# Patient Record
Sex: MALE | Race: WHITE | NOT HISPANIC OR LATINO | Employment: FULL TIME | URBAN - METROPOLITAN AREA
[De-identification: names, ages, dates, MRNs, and addresses within clinical notes are randomized per-mention and may not be internally consistent; named-entity substitution may affect disease eponyms.]

---

## 2021-12-30 ENCOUNTER — E-VISIT (OUTPATIENT)
Dept: FAMILY MEDICINE | Facility: CLINIC | Age: 31
End: 2021-12-30
Payer: COMMERCIAL

## 2021-12-30 DIAGNOSIS — Z20.822 SUSPECTED COVID-19 VIRUS INFECTION: Primary | ICD-10-CM

## 2021-12-30 PROCEDURE — 99421 OL DIG E/M SVC 5-10 MIN: CPT | Performed by: NURSE PRACTITIONER

## 2021-12-31 NOTE — PATIENT INSTRUCTIONS
Mario,      Based on your responses, you may have coronavirus (COVID-19). This illness can cause fever, cough and trouble breathing. Many people get a mild case and get better on their own. Some people can get very sick.    Will I be tested for COVID-19?  We would like to test you for COVID-19 virus. I have placed orders for this test.     To schedule: go to your [employer] home page and scroll down to the section that says  You have an appointment that needs to be scheduled  and click the large green button that says  Schedule Now  and follow the steps to find the next available openings.    If you are unable to complete these [employer] scheduling steps, please call [phone] to schedule your testing.     Return to work/school/ guidance:  Please let your workplace manager and staffing office know when your isolation ends.       If you receive a positive COVID-19 test result, follow the guidance of the those who are giving you the results. Usually the return to work is 10 days from symptom onset or positive test date, (or in some cases 20 days if you are immunocompromised). If your symptoms started after your positive test, the 10 days should start when your symptoms started.   o If you work at [employer], you must also be cleared by Employee Occupational Health and Safety to return to work.      If you receive a negative COVID-19 test result and did not have a high risk exposure to someone with a known positive COVID-19 test, you can return to work once you're free of fever for 24 hours without fever-reducing medication and your symptoms are improving or resolved.    If you receive a negative COVID-19 test and had a high-risk exposure to someone who has tested positive for COVID-19 then you can return to work 14 days after your last contact with the positive individual. Follow quarantine guidance given by your doctor or public health officials.     Sign up for GetWell Loop:  We know it's scary to  hear that you might have COVID-19. We want to track your symptoms to make sure you're okay over the next 2 weeks. Please look for an email from [employer]--this is a free, online program that we'll use to keep in touch. To sign up, follow the link in the email you will receive. Learn more at [email or web address]    How can I take care of myself?  Over the counter medications may help with your symptoms like congestion, cough, chills, or fever.    There are not many effective prescription treatments for early COVID-19. Hydroxychloroquine, ivermectin, and azithromycin are not effective or recommended for COVID-19.    If your symptoms started in the last 10 days, you may be able to receive a treatment with monoclonal antibodies. This treatment can lower your risk of severe illness and going to the hospital. It is given through an IV or under your skin (subcutaneous) and must be given at an infusion center. You must be 12 or older, weight at least 88 pounds, and have a positive COVID-19 test.     If you would like to sign up to be considered to receive the monoclonal antibody medicine, please complete a participation form through the [facility] here: MNRAP ([email or web address]). You may also call the [facility] COVID-19 Public Hotline at 1-567.228.3698 (open Mon-Fri: 9am-7pm and Sat: 10am-6pm).     Not all people who are eligible will receive the medicine, since supply is limited. You will be contacted in the next 1 to 2 business days only if you are selected. If you do not receive a call, you have not been selected to receive the medicine. If you have any questions about this medication, please contact your primary care provider. For more information, see [email or web address]      Get lots of rest. Drink extra fluids (unless a doctor has told you not to)    Take Tylenol (acetaminophen) or ibuprofen for fever  or pain. If you have liver or kidney problems, ask your family doctor if it's okay to take Tylenol o ibuprofen    Take over the counter medications for your symptoms, as directed by your doctor. You may also talk to your pharmacist.      If you have other health problems (like cancer, heart failure, an organ transplant or severe kidney disease): Call your specialty clinic if you don't feel better in the next 2 days.    Know when to call 911. Emergency warning signs include:  o Trouble breathing or shortness of breath  o Pain or pressure in the chest that doesn't go away  o Feeling confused like you haven't felt before, or not being able to wake up  o Bluish-colored lips or face    Where can I get more information?    [facility] - About COVID-19: [email or web address]     CDC - What to Do If You're Sick:     www.cdc.gov/coronavirus/2019-ncov/about/steps-when-sick.html    CDC - Ending Home Isolation:  https://www.cdc.gov/coronavirus/2019-ncov/your-health/quarantine-isolation.html    CDC - Caring for Someone:  www.cdc.gov/coronavirus/2019-ncov/if-you-are-sick/care-for-someone.html    [facility] clinical trials (COVID-19 research studies): clinicalaffairs.[facility]/n-clinical-trials    Below are the COVID-19 hotlines at the [facility] ([facility]). Interpreters are available.  o For health questions: Call 114-093-7591 or 1-376.833.9935 (7 a.m. to 7 p.m.)  o For questions about schools and childcare: Call 781-388-0348 or 1-128.775.7848 (7 a.m. to 7 p.m.)

## 2022-01-16 ENCOUNTER — HEALTH MAINTENANCE LETTER (OUTPATIENT)
Age: 32
End: 2022-01-16

## 2022-10-31 ENCOUNTER — OFFICE VISIT (OUTPATIENT)
Dept: FAMILY MEDICINE | Facility: CLINIC | Age: 32
End: 2022-10-31
Payer: COMMERCIAL

## 2022-10-31 VITALS
SYSTOLIC BLOOD PRESSURE: 124 MMHG | HEART RATE: 68 BPM | RESPIRATION RATE: 16 BRPM | TEMPERATURE: 98.1 F | DIASTOLIC BLOOD PRESSURE: 74 MMHG | WEIGHT: 229 LBS

## 2022-10-31 DIAGNOSIS — R31.1 BENIGN ESSENTIAL MICROSCOPIC HEMATURIA: ICD-10-CM

## 2022-10-31 DIAGNOSIS — Z00.00 ANNUAL PHYSICAL EXAM: Primary | ICD-10-CM

## 2022-10-31 LAB
ALBUMIN SERPL BCG-MCNC: 4.7 G/DL (ref 3.5–5.2)
ALBUMIN UR-MCNC: ABNORMAL MG/DL
ALP SERPL-CCNC: 74 U/L (ref 40–129)
ALT SERPL W P-5'-P-CCNC: 56 U/L (ref 10–50)
ANION GAP SERPL CALCULATED.3IONS-SCNC: 9 MMOL/L (ref 7–15)
APPEARANCE UR: CLEAR
AST SERPL W P-5'-P-CCNC: 25 U/L (ref 10–50)
BILIRUB SERPL-MCNC: 0.3 MG/DL
BILIRUB UR QL STRIP: NEGATIVE
BUN SERPL-MCNC: 10.5 MG/DL (ref 6–20)
CALCIUM SERPL-MCNC: 9.5 MG/DL (ref 8.6–10)
CHLORIDE SERPL-SCNC: 103 MMOL/L (ref 98–107)
CHOLEST SERPL-MCNC: 174 MG/DL
COLOR UR AUTO: YELLOW
CREAT SERPL-MCNC: 0.94 MG/DL (ref 0.67–1.17)
DEPRECATED HCO3 PLAS-SCNC: 28 MMOL/L (ref 22–29)
ERYTHROCYTE [DISTWIDTH] IN BLOOD BY AUTOMATED COUNT: 12.2 % (ref 10–15)
GFR SERPL CREATININE-BSD FRML MDRD: >90 ML/MIN/1.73M2
GLUCOSE SERPL-MCNC: 115 MG/DL (ref 70–99)
GLUCOSE UR STRIP-MCNC: NEGATIVE MG/DL
HCT VFR BLD AUTO: 43.5 % (ref 40–53)
HDLC SERPL-MCNC: 29 MG/DL
HGB BLD-MCNC: 14.5 G/DL (ref 13.3–17.7)
HGB UR QL STRIP: ABNORMAL
KETONES UR STRIP-MCNC: NEGATIVE MG/DL
LDLC SERPL CALC-MCNC: 82 MG/DL
LEUKOCYTE ESTERASE UR QL STRIP: NEGATIVE
MCH RBC QN AUTO: 31 PG (ref 26.5–33)
MCHC RBC AUTO-ENTMCNC: 33.3 G/DL (ref 31.5–36.5)
MCV RBC AUTO: 93 FL (ref 78–100)
NITRATE UR QL: NEGATIVE
NONHDLC SERPL-MCNC: 145 MG/DL
PH UR STRIP: 5.5 [PH] (ref 5–7)
PLATELET # BLD AUTO: 164 10E3/UL (ref 150–450)
POTASSIUM SERPL-SCNC: 4.3 MMOL/L (ref 3.4–5.3)
PROT SERPL-MCNC: 7.1 G/DL (ref 6.4–8.3)
RBC # BLD AUTO: 4.68 10E6/UL (ref 4.4–5.9)
RBC #/AREA URNS AUTO: ABNORMAL /HPF
SODIUM SERPL-SCNC: 140 MMOL/L (ref 136–145)
SP GR UR STRIP: >=1.03 (ref 1–1.03)
SQUAMOUS #/AREA URNS AUTO: ABNORMAL /LPF
TRIGL SERPL-MCNC: 315 MG/DL
UROBILINOGEN UR STRIP-ACNC: 0.2 E.U./DL
WBC # BLD AUTO: 7.9 10E3/UL (ref 4–11)
WBC #/AREA URNS AUTO: ABNORMAL /HPF

## 2022-10-31 PROCEDURE — 36415 COLL VENOUS BLD VENIPUNCTURE: CPT | Performed by: NURSE PRACTITIONER

## 2022-10-31 PROCEDURE — 80053 COMPREHEN METABOLIC PANEL: CPT | Performed by: NURSE PRACTITIONER

## 2022-10-31 PROCEDURE — 80061 LIPID PANEL: CPT | Performed by: NURSE PRACTITIONER

## 2022-10-31 PROCEDURE — 99395 PREV VISIT EST AGE 18-39: CPT | Performed by: NURSE PRACTITIONER

## 2022-10-31 PROCEDURE — 85027 COMPLETE CBC AUTOMATED: CPT | Performed by: NURSE PRACTITIONER

## 2022-10-31 PROCEDURE — 81001 URINALYSIS AUTO W/SCOPE: CPT | Performed by: NURSE PRACTITIONER

## 2022-10-31 ASSESSMENT — ENCOUNTER SYMPTOMS
DYSURIA: 0
ABDOMINAL PAIN: 0
PARESTHESIAS: 0
NAUSEA: 0
EYE PAIN: 0
HEMATURIA: 0
MYALGIAS: 0
SHORTNESS OF BREATH: 0
CONSTIPATION: 0
SORE THROAT: 0
WEAKNESS: 0
FREQUENCY: 0
CHILLS: 0
ARTHRALGIAS: 0
DIARRHEA: 0
NERVOUS/ANXIOUS: 0
PALPITATIONS: 0
COUGH: 0
JOINT SWELLING: 0
HEARTBURN: 0
FEVER: 0
DIZZINESS: 0
HEMATOCHEZIA: 0
HEADACHES: 0

## 2022-10-31 ASSESSMENT — PAIN SCALES - GENERAL: PAINLEVEL: NO PAIN (0)

## 2022-10-31 NOTE — PROGRESS NOTES
SUBJECTIVE:   CC: Mario is an 32 year old who presents for preventative health visit.       Patient has been advised of split billing requirements and indicates understanding: Yes  Healthy Habits:     Getting at least 3 servings of Calcium per day:  Yes    Bi-annual eye exam:  Yes    Dental care twice a year:  NO    Sleep apnea or symptoms of sleep apnea:  None    Diet:  Regular (no restrictions)    Frequency of exercise:  None    Taking medications regularly:  Not Applicable    Medication side effects:  Not applicable    PHQ-2 Total Score: 0    Additional concerns today:  No          Today's PHQ-2 Score:   PHQ-2 ( 1999 Pfizer) 10/31/2022   Q1: Little interest or pleasure in doing things 0   Q2: Feeling down, depressed or hopeless 0   PHQ-2 Score 0   Q1: Little interest or pleasure in doing things Not at all   Q2: Feeling down, depressed or hopeless Not at all   PHQ-2 Score 0       Abuse: Current or Past(Physical, Sexual or Emotional)- No  Do you feel safe in your environment? Yes    Have you ever done Advance Care Planning? (For example, a Health Directive, POLST, or a discussion with a medical provider or your loved ones about your wishes): No, advance care planning information given to patient to review.  Patient declined advance care planning discussion at this time.    Social History     Tobacco Use     Smoking status: Not on file     Smokeless tobacco: Not on file   Substance Use Topics     Alcohol use: Not on file     If you drink alcohol do you typically have >3 drinks per day or >7 drinks per week? No    Alcohol Use 10/31/2022   Prescreen: >3 drinks/day or >7 drinks/week? No       Last PSA: No results found for: PSA    Reviewed orders with patient. Reviewed health maintenance and updated orders accordingly - Yes  BP Readings from Last 3 Encounters:   10/31/22 124/74    Wt Readings from Last 3 Encounters:   10/31/22 103.9 kg (229 lb)                  There is no problem list on file for this patient.     History reviewed. No pertinent surgical history.    Social History     Tobacco Use     Smoking status: Every Day     Types: Cigarettes     Smokeless tobacco: Never   Substance Use Topics     Alcohol use: Not Currently     History reviewed. No pertinent family history.      No current outpatient medications on file.     No Known Allergies  No lab results found.     Reviewed and updated as needed this visit by clinical staff                  Reviewed and updated as needed this visit by Provider                 History reviewed. No pertinent past medical history.   History reviewed. No pertinent surgical history.  OB History   No obstetric history on file.       Review of Systems   Constitutional: Negative for chills and fever.   HENT: Negative for congestion, ear pain, hearing loss and sore throat.    Eyes: Negative for pain and visual disturbance.   Respiratory: Negative for cough and shortness of breath.    Cardiovascular: Negative for chest pain, palpitations and peripheral edema.   Gastrointestinal: Negative for abdominal pain, constipation, diarrhea, heartburn, hematochezia and nausea.   Genitourinary: Negative for dysuria, frequency, genital sores, hematuria, impotence, penile discharge and urgency.   Musculoskeletal: Negative for arthralgias, joint swelling and myalgias.   Skin: Negative for rash.   Neurological: Negative for dizziness, weakness, headaches and paresthesias.   Psychiatric/Behavioral: Negative for mood changes. The patient is not nervous/anxious.          OBJECTIVE:   /74 (BP Location: Right arm, Cuff Size: Adult Large)   Pulse 68   Temp 98.1  F (36.7  C) (Tympanic)   Resp 16   Wt 103.9 kg (229 lb)     Physical Exam  GENERAL: healthy, alert and no distress  EYES: Eyes grossly normal to inspection, PERRL and conjunctivae and sclerae normal  HENT: ear canals and TM's normal, nose and mouth without ulcers or lesions  NECK: no adenopathy, no asymmetry, masses, or scars and thyroid normal to  palpation  RESP: lungs clear to auscultation - no rales, rhonchi or wheezes  CV: regular rate and rhythm, normal S1 S2, no S3 or S4, no murmur, click or rub, no peripheral edema and peripheral pulses strong  MS: no gross musculoskeletal defects noted, no edema  SKIN: no suspicious lesions or rashes  NEURO: Normal strength and tone, mentation intact and speech normal  PSYCH: mentation appears normal, affect normal/bright        ASSESSMENT/PLAN:   (Z00.00) Annual physical exam  (primary encounter diagnosis)  Comment:   Plan: Comprehensive metabolic panel (BMP + Alb, Alk         Phos, ALT, AST, Total. Bili, TP), Lipid panel         reflex to direct LDL Fasting, CBC with         platelets, Lipid panel reflex to direct LDL         Fasting, UA Macro with Reflex to Micro and         Culture - lab collect              Patient has been advised of split billing requirements and indicates understanding: Yes      COUNSELING:   Reviewed preventive health counseling, as reflected in patient instructions       Regular exercise       Healthy diet/nutrition       Vision screening       Hearing screening       Alcohol Use        Consider Hep C screening for all patients one time for ages 18-79 years       HIV screeninx in teen years, 1x in adult years, and at intervals if high risk    There is no height or weight on file to calculate BMI.     Weight management plan: Discussed healthy diet and exercise guidelines    He has no history on file for tobacco use.      Counseling Resources:  ATP IV Guidelines  Pooled Cohorts Equation Calculator  FRAX Risk Assessment  ICSI Preventive Guidelines  Dietary Guidelines for Americans,   USDA's MyPlate  ASA Prophylaxis  Lung CA Screening    Elidia Paz, APRN CNP  [facility]

## 2022-11-14 ENCOUNTER — APPOINTMENT (OUTPATIENT)
Dept: CT IMAGING | Facility: CLINIC | Age: 32
End: 2022-11-14
Attending: EMERGENCY MEDICINE
Payer: OTHER MISCELLANEOUS

## 2022-11-14 ENCOUNTER — HOSPITAL ENCOUNTER (EMERGENCY)
Facility: CLINIC | Age: 32
Discharge: HOME | End: 2022-11-14
Attending: EMERGENCY MEDICINE | Admitting: EMERGENCY MEDICINE
Payer: OTHER MISCELLANEOUS

## 2022-11-14 VITALS
OXYGEN SATURATION: 93 % | HEART RATE: 71 BPM | WEIGHT: 230 LBS | DIASTOLIC BLOOD PRESSURE: 96 MMHG | SYSTOLIC BLOOD PRESSURE: 148 MMHG | RESPIRATION RATE: 18 BRPM | TEMPERATURE: 97 F

## 2022-11-14 DIAGNOSIS — S32.009A CLOSED FRACTURE OF TRANSVERSE PROCESS OF LUMBAR VERTEBRA, INITIAL ENCOUNTER (H): ICD-10-CM

## 2022-11-14 LAB
ALBUMIN SERPL BCG-MCNC: 5 G/DL (ref 3.5–5.2)
ALBUMIN UR-MCNC: 30 MG/DL
ALP SERPL-CCNC: 81 U/L (ref 40–129)
ALT SERPL W P-5'-P-CCNC: 64 U/L (ref 10–50)
ANION GAP SERPL CALCULATED.3IONS-SCNC: 11 MMOL/L (ref 7–15)
APPEARANCE UR: CLEAR
AST SERPL W P-5'-P-CCNC: 26 U/L (ref 10–50)
BACTERIA #/AREA URNS HPF: ABNORMAL /HPF
BASOPHILS # BLD AUTO: 0.1 10E3/UL (ref 0–0.2)
BASOPHILS NFR BLD AUTO: 0 %
BILIRUB SERPL-MCNC: 0.4 MG/DL
BILIRUB UR QL STRIP: NEGATIVE
BUN SERPL-MCNC: 8 MG/DL (ref 6–20)
CALCIUM SERPL-MCNC: 9.9 MG/DL (ref 8.6–10)
CHLORIDE SERPL-SCNC: 101 MMOL/L (ref 98–107)
COLOR UR AUTO: YELLOW
CREAT SERPL-MCNC: 0.99 MG/DL (ref 0.67–1.17)
DEPRECATED HCO3 PLAS-SCNC: 28 MMOL/L (ref 22–29)
EOSINOPHIL # BLD AUTO: 0.2 10E3/UL (ref 0–0.7)
EOSINOPHIL NFR BLD AUTO: 1 %
ERYTHROCYTE [DISTWIDTH] IN BLOOD BY AUTOMATED COUNT: 12 % (ref 10–15)
GFR SERPL CREATININE-BSD FRML MDRD: >90 ML/MIN/1.73M2
GLUCOSE SERPL-MCNC: 85 MG/DL (ref 70–99)
GLUCOSE UR STRIP-MCNC: NEGATIVE MG/DL
HCT VFR BLD AUTO: 44.6 % (ref 40–53)
HGB BLD-MCNC: 15.7 G/DL (ref 13.3–17.7)
HGB UR QL STRIP: NEGATIVE
HYALINE CASTS: 1 /LPF
IMM GRANULOCYTES # BLD: 0.1 10E3/UL
IMM GRANULOCYTES NFR BLD: 0 %
KETONES UR STRIP-MCNC: NEGATIVE MG/DL
LEUKOCYTE ESTERASE UR QL STRIP: NEGATIVE
LYMPHOCYTES # BLD AUTO: 2.4 10E3/UL (ref 0.8–5.3)
LYMPHOCYTES NFR BLD AUTO: 16 %
MCH RBC QN AUTO: 31 PG (ref 26.5–33)
MCHC RBC AUTO-ENTMCNC: 35.2 G/DL (ref 31.5–36.5)
MCV RBC AUTO: 88 FL (ref 78–100)
MONOCYTES # BLD AUTO: 0.6 10E3/UL (ref 0–1.3)
MONOCYTES NFR BLD AUTO: 4 %
NEUTROPHILS # BLD AUTO: 11.5 10E3/UL (ref 1.6–8.3)
NEUTROPHILS NFR BLD AUTO: 79 %
NITRATE UR QL: NEGATIVE
NRBC # BLD AUTO: 0 10E3/UL
NRBC BLD AUTO-RTO: 0 /100
PH UR STRIP: 7 [PH] (ref 5–7)
PLATELET # BLD AUTO: 188 10E3/UL (ref 150–450)
POTASSIUM SERPL-SCNC: 4.3 MMOL/L (ref 3.4–5.3)
PROT SERPL-MCNC: 7.8 G/DL (ref 6.4–8.3)
RBC # BLD AUTO: 5.06 10E6/UL (ref 4.4–5.9)
RBC URINE: <1 /HPF
SODIUM SERPL-SCNC: 140 MMOL/L (ref 136–145)
SP GR UR STRIP: 1.02 (ref 1–1.03)
SQUAMOUS EPITHELIAL: <1 /HPF
UROBILINOGEN UR STRIP-MCNC: NORMAL MG/DL
WBC # BLD AUTO: 14.8 10E3/UL (ref 4–11)
WBC URINE: <1 /HPF

## 2022-11-14 PROCEDURE — 250N000011 HC RX IP 250 OP 636: Performed by: EMERGENCY MEDICINE

## 2022-11-14 PROCEDURE — 96374 THER/PROPH/DIAG INJ IV PUSH: CPT | Mod: 59 | Performed by: EMERGENCY MEDICINE

## 2022-11-14 PROCEDURE — 96376 TX/PRO/DX INJ SAME DRUG ADON: CPT | Performed by: EMERGENCY MEDICINE

## 2022-11-14 PROCEDURE — 99285 EMERGENCY DEPT VISIT HI MDM: CPT | Performed by: EMERGENCY MEDICINE

## 2022-11-14 PROCEDURE — 96375 TX/PRO/DX INJ NEW DRUG ADDON: CPT | Performed by: EMERGENCY MEDICINE

## 2022-11-14 PROCEDURE — 99285 EMERGENCY DEPT VISIT HI MDM: CPT | Mod: 25 | Performed by: EMERGENCY MEDICINE

## 2022-11-14 PROCEDURE — 72131 CT LUMBAR SPINE W/O DYE: CPT

## 2022-11-14 PROCEDURE — 74177 CT ABD & PELVIS W/CONTRAST: CPT

## 2022-11-14 PROCEDURE — 80053 COMPREHEN METABOLIC PANEL: CPT | Performed by: EMERGENCY MEDICINE

## 2022-11-14 PROCEDURE — 36415 COLL VENOUS BLD VENIPUNCTURE: CPT | Performed by: EMERGENCY MEDICINE

## 2022-11-14 PROCEDURE — 81001 URINALYSIS AUTO W/SCOPE: CPT | Performed by: EMERGENCY MEDICINE

## 2022-11-14 PROCEDURE — 250N000013 HC RX MED GY IP 250 OP 250 PS 637: Performed by: EMERGENCY MEDICINE

## 2022-11-14 PROCEDURE — 72192 CT PELVIS W/O DYE: CPT

## 2022-11-14 PROCEDURE — 82040 ASSAY OF SERUM ALBUMIN: CPT | Performed by: EMERGENCY MEDICINE

## 2022-11-14 PROCEDURE — 85004 AUTOMATED DIFF WBC COUNT: CPT | Performed by: EMERGENCY MEDICINE

## 2022-11-14 PROCEDURE — 250N000009 HC RX 250: Performed by: EMERGENCY MEDICINE

## 2022-11-14 RX ORDER — AMOXICILLIN 250 MG
1 CAPSULE ORAL 2 TIMES DAILY PRN
Qty: 14 TABLET | Refills: 0 | Status: SHIPPED | OUTPATIENT
Start: 2022-11-14 | End: 2022-11-21

## 2022-11-14 RX ORDER — KETOROLAC TROMETHAMINE 15 MG/ML
15 INJECTION, SOLUTION INTRAMUSCULAR; INTRAVENOUS ONCE
Status: COMPLETED | OUTPATIENT
Start: 2022-11-14 | End: 2022-11-14

## 2022-11-14 RX ORDER — OXYCODONE AND ACETAMINOPHEN 5; 325 MG/1; MG/1
1-2 TABLET ORAL EVERY 6 HOURS PRN
Qty: 20 TABLET | Refills: 0 | Status: SHIPPED | OUTPATIENT
Start: 2022-11-14 | End: 2022-12-14

## 2022-11-14 RX ORDER — IOPAMIDOL 755 MG/ML
100 INJECTION, SOLUTION INTRAVASCULAR ONCE
Status: COMPLETED | OUTPATIENT
Start: 2022-11-14 | End: 2022-11-14

## 2022-11-14 RX ORDER — CYCLOBENZAPRINE HCL 10 MG
10 TABLET ORAL 3 TIMES DAILY PRN
Qty: 30 TABLET | Refills: 0 | Status: SHIPPED | OUTPATIENT
Start: 2022-11-14 | End: 2022-12-14

## 2022-11-14 RX ORDER — OXYCODONE AND ACETAMINOPHEN 5; 325 MG/1; MG/1
2 TABLET ORAL ONCE
Status: COMPLETED | OUTPATIENT
Start: 2022-11-14 | End: 2022-11-14

## 2022-11-14 RX ORDER — HYDROMORPHONE HYDROCHLORIDE 1 MG/ML
0.5 INJECTION, SOLUTION INTRAMUSCULAR; INTRAVENOUS; SUBCUTANEOUS
Status: DISCONTINUED | OUTPATIENT
Start: 2022-11-14 | End: 2022-11-14 | Stop reason: HOSPADM

## 2022-11-14 RX ADMIN — HYDROMORPHONE HYDROCHLORIDE 0.5 MG: 1 INJECTION, SOLUTION INTRAMUSCULAR; INTRAVENOUS; SUBCUTANEOUS at 17:00

## 2022-11-14 RX ADMIN — OXYCODONE HYDROCHLORIDE AND ACETAMINOPHEN 2 TABLET: 5; 325 TABLET ORAL at 16:00

## 2022-11-14 RX ADMIN — KETOROLAC TROMETHAMINE 15 MG: 15 INJECTION, SOLUTION INTRAMUSCULAR; INTRAVENOUS at 13:39

## 2022-11-14 RX ADMIN — HYDROMORPHONE HYDROCHLORIDE 0.5 MG: 1 INJECTION, SOLUTION INTRAMUSCULAR; INTRAVENOUS; SUBCUTANEOUS at 13:39

## 2022-11-14 RX ADMIN — SODIUM CHLORIDE 60 ML: 9 INJECTION, SOLUTION INTRAVENOUS at 14:19

## 2022-11-14 RX ADMIN — IOPAMIDOL 90 ML: 755 INJECTION, SOLUTION INTRAVENOUS at 14:19

## 2022-11-14 ASSESSMENT — ACTIVITIES OF DAILY LIVING (ADL)
ADLS_ACUITY_SCORE: 35
ADLS_ACUITY_SCORE: 35
ADLS_ACUITY_SCORE: 33

## 2022-11-14 NOTE — ED TRIAGE NOTES
Left hip pain since fall this AM, unable to bear full weight on leg     Triage Assessment     Row Name 11/14/22 1054       Triage Assessment (Adult)    Airway WDL WDL       Respiratory WDL    Respiratory WDL WDL       Peripheral/Neurovascular WDL    Peripheral Neurovascular WDL WDL       Cognitive/Neuro/Behavioral WDL    Cognitive/Neuro/Behavioral WDL WDL

## 2022-11-14 NOTE — LETTER
November 14, 2022      To Whom It May Concern:      Mario Lay was seen in our Emergency Department today, Monday 11/14/22.  I expect his condition to improve over the several week he may return to work when improved.  Please excuse him from work as needed this week, in the next 1 week, and then he may return to work with light duty with limited to no lifting, bending, pushing, pulling, climbing, etc. with limitation as needed in the next several weeks to 4 weeks due to back/lumbar spinous process fractures.    Sincerely,        BRODIE Zuleta MD

## 2022-11-14 NOTE — ED PROVIDER NOTES
History     Chief Complaint   Patient presents with     Hip Pain     HPI   History per patient, his wife and review of EMR.  Mario Lay is a 32 year old male who was working as a  shortly prior to arrival when he slipped and fell onto his left low back causing sudden, sharp, severe left mid back and left-sided low back pain which is constant, sometimes radiates to the left leg laterally just past the knee, but not into the foot, and into the left abdomen..  No motor deficit or weakness.  No distal left lower extremity neurovascular abnormality.  No upper back or neck pain or injury.  No head injury.  No other injury or trauma.  No anticoagulation therapy.  No other pertinent history or acute complaints or concerns    Previous Records Reviewed:  Allergies    No known active allergiesAuthored by: Faby Turner,Authored on: Nov. 23, 2007 3:31 PM,Source organization: [employer]   Medications    Medications  Medication Sig Dispensed Refills Start Date End Date Status   cyclobenzaprine (FLEXERIL) 10 mg tablet    Indications: Neck painAuthored by: MYRIAM Germain,Authored on: Mar. 14, 2018 8:35 PM,Source organization: [facility]  Take 1 tablet by mouth 3 times daily. 30 tablet   0 03/14/2018   Active   naproxen (NAPROSYN) 500 mg tablet    Indications: Neck painAuthored by: MYRIAM Germain,Authored on: Mar. 14, 2018 8:35 PM,Source organization: [facility]  Take 1 tablet by mouth 2 times daily with meals. 30 tablet   0 03/14/2018   Active   oxyCODONE-acetaminophen, 5-325 mg, (PERCOCET) 5-325 mg per tablet    Indications: Neck painAuthored by: MYRIAM Germain,Authored on: Mar. 14, 2018 8:35 PM,Source organization: [facility]  Take 1-2 tablets by mouth every 6 hours if needed for Pain Max acetaminophen  dose: 4000mg in 24 hrs. 12 tablet   0 03/14/2018   Active     Active Problems    No known active problems    Surgical History    Surgical History  Surgery Date Site/Laterality Comments   NO PREVIOUS SURGERY            Medical History    Medical History  Medical History Date Comments   No Significant Past Medical History         Family History    Family History  Medical History Relation Name Comments   Cancer Father    Bladder cancer   Other Mother    Back pain    Hypertension Other 1       Diabetes Other 2         Family History  Relation Name Status Comments   Father          Mother          Other 1         Other 2           Social History    Social History  Tobacco Use Types Packs/Day Years Used Date   Current Every Day Smoker   0.5 10     Smokeless Tobacco: Current User Chew           Allergies:  No Known Allergies    Problem List:    There are no problems to display for this patient.       Past Medical History:    History reviewed. No pertinent past medical history.    Past Surgical History:    History reviewed. No pertinent surgical history.    Family History:    History reviewed. No pertinent family history.    Social History:  Marital Status:   [2]  Social History     Tobacco Use     Smoking status: Every Day     Types: Cigarettes     Smokeless tobacco: Never   Vaping Use     Vaping Use: Never used   Substance Use Topics     Alcohol use: Not Currently     Drug use: Not Currently        Medications:    cyclobenzaprine (FLEXERIL) 10 MG tablet  oxyCODONE-acetaminophen (PERCOCET) 5-325 MG tablet  senna-docusate (SENOKOT-S/PERICOLACE) 8.6-50 MG tablet        Review of Systems  As mentioned above in the history present illness.  All other systems were reviewed and are negative.    Physical Exam   BP: (!) 142/92  Pulse: 103  Temp: 97  F (36.1  C)  Resp: 18  Weight: 104.3 kg (230 lb)  SpO2: 98 %      Physical Exam  Vitals and nursing note reviewed.   Constitutional:       General: He is not in acute  distress.     Appearance: Normal appearance. He is well-developed. He is not ill-appearing or diaphoretic.   HENT:      Head: Normocephalic and atraumatic.      Right Ear: External ear normal.      Left Ear: External ear normal.      Nose: Nose normal.      Mouth/Throat:      Mouth: Mucous membranes are moist.   Eyes:      General: No scleral icterus.     Extraocular Movements: Extraocular movements intact.      Conjunctiva/sclera: Conjunctivae normal.   Neck:      Trachea: No tracheal deviation.   Cardiovascular:      Rate and Rhythm: Normal rate and regular rhythm.      Heart sounds: Normal heart sounds. No murmur heard.    No friction rub. No gallop.   Pulmonary:      Effort: Pulmonary effort is normal. No respiratory distress.      Breath sounds: Normal breath sounds. No wheezing, rhonchi or rales.   Abdominal:      General: There is no distension.      Palpations: Abdomen is soft. There is no mass.      Tenderness: There is abdominal tenderness (Lateral left abdomen/flank tender). There is left CVA tenderness. There is no right CVA tenderness, guarding or rebound.      Hernia: No hernia is present.   Musculoskeletal:         General: No swelling or deformity.      Cervical back: Normal, normal range of motion and neck supple. No signs of trauma, tenderness or bony tenderness.      Thoracic back: Normal. No deformity, signs of trauma or bony tenderness.      Lumbar back: Bony tenderness present. No swelling, edema or deformity. Decreased range of motion.        Back:       Right lower leg: No edema.      Left lower leg: No edema.   Skin:     General: Skin is warm and dry.      Coloration: Skin is not pale.      Findings: No erythema or rash.   Neurological:      General: No focal deficit present.      Mental Status: He is alert and oriented to person, place, and time.      GCS: GCS eye subscore is 4. GCS verbal subscore is 5. GCS motor subscore is 6.      Cranial Nerves: Cranial nerves 2-12 are intact.       Sensory: Sensation is intact. No sensory deficit.      Motor: Motor function is intact. No weakness.   Psychiatric:         Mood and Affect: Mood normal.         Behavior: Behavior normal.         ED Course                 Procedures              Results for orders placed or performed during the hospital encounter of 11/14/22 (from the past 24 hour(s))   Comprehensive metabolic panel   Result Value Ref Range    Sodium 140 136 - 145 mmol/L    Potassium 4.3 3.4 - 5.3 mmol/L    Chloride 101 98 - 107 mmol/L    Carbon Dioxide (CO2) 28 22 - 29 mmol/L    Anion Gap 11 7 - 15 mmol/L    Urea Nitrogen 8.0 6.0 - 20.0 mg/dL    Creatinine 0.99 0.67 - 1.17 mg/dL    Calcium 9.9 8.6 - 10.0 mg/dL    Glucose 85 70 - 99 mg/dL    Alkaline Phosphatase 81 40 - 129 U/L    AST 26 10 - 50 U/L    ALT 64 (H) 10 - 50 U/L    Protein Total 7.8 6.4 - 8.3 g/dL    Albumin 5.0 3.5 - 5.2 g/dL    Bilirubin Total 0.4 <=1.2 mg/dL    GFR Estimate >90 >60 mL/min/1.73m2   CBC with platelets differential    Narrative    The following orders were created for panel order CBC with platelets differential.  Procedure                               Abnormality         Status                     ---------                               -----------         ------                     CBC with platelets and d...[[id number]]  Abnormal            Final result                 Please view results for these tests on the individual orders.   CBC with platelets and differential   Result Value Ref Range    WBC Count 14.8 (H) 4.0 - 11.0 10e3/uL    RBC Count 5.06 4.40 - 5.90 10e6/uL    Hemoglobin 15.7 13.3 - 17.7 g/dL    Hematocrit 44.6 40.0 - 53.0 %    MCV 88 78 - 100 fL    MCH 31.0 26.5 - 33.0 pg    MCHC 35.2 31.5 - 36.5 g/dL    RDW 12.0 10.0 - 15.0 %    Platelet Count 188 150 - 450 10e3/uL    % Neutrophils 79 %    % Lymphocytes 16 %    % Monocytes 4 %    % Eosinophils 1 %    % Basophils 0 %    % Immature Granulocytes 0 %    NRBCs per 100 WBC 0 <1 /100    Absolute Neutrophils  11.5 (H) 1.6 - 8.3 10e3/uL    Absolute Lymphocytes 2.4 0.8 - 5.3 10e3/uL    Absolute Monocytes 0.6 0.0 - 1.3 10e3/uL    Absolute Eosinophils 0.2 0.0 - 0.7 10e3/uL    Absolute Basophils 0.1 0.0 - 0.2 10e3/uL    Absolute Immature Granulocytes 0.1 <=0.4 10e3/uL    Absolute NRBCs 0.0 10e3/uL   CT Lumbar Spine w/o Contrast    Narrative    CT LUMBAR SPINE WITHOUT CONTRAST  11/14/2022 2:30 PM     HISTORY: Fall, blunt trauma, pain radiating into the left left leg to  just past left knee      TECHNIQUE: Axial images of the lumbar spine were obtained without  intravenous contrast. Multiplanar reformations were performed.   Radiation dose for this scan was reduced using automated exposure  control, adjustment of the mA and/or kV according to patient size, or  iterative reconstruction technique.     COMPARISON: None.     FINDINGS:  There are 5 lumbar-type vertebral bodies assumed for the  purposes of this dictation.     Lumbar spine alignment is within normal limits. No loss of vertebral  body height. Acute appearing displaced fractures through the left L2  and L3 transverse processes. Subtle cortical irregularity of the left  L1 transverse process which could represent a fracture. No other  fractures are appreciated.    No significant degenerative disc changes or loss of disc height. No  disc herniation. Normal facets. No spinal canal or neural foraminal  narrowing.    Visualized paraspinous soft tissues are unremarkable.      Impression    IMPRESSION:    1. Acute appearing displaced fractures of the left L2 and L3  transverse processes.  2. Subtle cortical step-off of the left L1 transverse process which  could also represent a fracture.  3. No other fractures appreciated. No loss of lumbar vertebral body  height. Lumbar spine alignment is within normal limits.     ANALI PEREZ MD         SYSTEM ID:  [id number]   CT Pelvis Bone wo Contrast    Narrative    CT PELVIS BONE WITHOUT CONTRAST 11/14/2022 2:30 PM    INDICATION: Low  back and sacral pain after a fall.  COMPARISON: None.    TECHNIQUE: Noncontrast. Axial, sagittal and coronal thin-section  reconstruction. Dose reduction techniques were used.   CONTRAST: None.    FINDINGS:     BONES AND JOINTS:  -Normal joint spacing and alignment.  -No fracture.  -Small benign bone island within the right iliac wing.    MUSCULATURE AND SOFT TISSUES:  -No muscle atrophy.  -No soft tissue fluid collection or contusion.      Impression    IMPRESSION:  1.  Normal noncontrast CT of the pelvis.  2.  No fracture or joint malalignment.      NIDIA MANE MD         SYSTEM ID:  [id number]   CT Abdomen Pelvis w Contrast    Narrative    CT ABDOMEN PELVIS WITH CONTRAST 11/14/2022 2:32 PM    CLINICAL HISTORY: Blunt trauma left flank, evaluate for traumatic  abnormality    TECHNIQUE: CT scan of the abdomen and pelvis was performed following  injection of IV contrast. Multiplanar reformats were obtained. Dose  reduction techniques were used.  CONTRAST: 90 mL Isovue 370    COMPARISON: None.    FINDINGS:   LOWER CHEST: Mild atelectasis in both lung bases.    HEPATOBILIARY: Diffuse hepatic steatosis. No focal lesions. Portal  vein is patent.    PANCREAS: Normal.    SPLEEN: Splenomegaly is noted with spleen measuring 16.1 cm in length.  No focal lesions. No evidence for trauma.    ADRENAL GLANDS: Normal.    KIDNEYS/BLADDER: Normal.    BOWEL: Normal.    PELVIC ORGANS: The urinary bladder is distended. No evidence of wall  thickening or mass.    ADDITIONAL FINDINGS: None.    MUSCULOSKELETAL: Minimally displaced fractures are seen of the left  lateral L2 and L3 transverse processes. No evidence for hematoma or  extravasation of contrast.      Impression    IMPRESSION:   1.  Minimally displaced fractures of the left lateral L2 and L3  transverse processes.  2.  Diffuse hepatic steatosis.  3.  Splenomegaly.  4.  No evidence for solid organ injury.    ROYAL POSADA MD         SYSTEM ID:  [id number]   UA with  Microscopic reflex to Culture    Specimen: Urine, Midstream   Result Value Ref Range    Color Urine Yellow Colorless, Straw, Light Yellow, Yellow    Appearance Urine Clear Clear    Glucose Urine Negative Negative mg/dL    Bilirubin Urine Negative Negative    Ketones Urine Negative Negative mg/dL    Specific Gravity Urine 1.020 1.003 - 1.035    Blood Urine Negative Negative    pH Urine 7.0 5.0 - 7.0    Protein Albumin Urine 30 (A) Negative mg/dL    Urobilinogen Urine Normal Normal, 2.0 mg/dL    Nitrite Urine Negative Negative    Leukocyte Esterase Urine Negative Negative    Bacteria Urine Few (A) None Seen /HPF    RBC Urine <1 <=2 /HPF    WBC Urine <1 <=5 /HPF    Squamous Epithelials Urine <1 <=1 /HPF    Hyaline Casts Urine 1 <=2 /LPF    Narrative    Urine Culture not indicated       Medications   HYDROmorphone (PF) (DILAUDID) injection 0.5 mg (0.5 mg Intravenous Given 11/14/22 1700)   ketorolac (TORADOL) injection 15 mg (15 mg Intravenous Given 11/14/22 1339)   iopamidol (ISOVUE-370) solution 100 mL (90 mLs Intravenous Given 11/14/22 1419)   sodium chloride 0.9 % bag 500mL for CT scan flush use (60 mLs As instructed Given 11/14/22 1419)   oxyCODONE-acetaminophen (PERCOCET) 5-325 MG per tablet 2 tablet (2 tablets Oral Given 11/14/22 1600)     4:47 PM - Dr. Wong, [facility]    Assessments & Plan (with Medical Decision Making)   32-year-old male with fall onto the left back after slipping on icy snowy ground while working as a  shortly prior to arrival. CT studies remarkable for displaced/minimally displaced fractures of the left lateral L2 and L3 transverse spinous processes, and possible left L1 transverse spinous process fracture.  Laboratory evaluation remarkable for an elevated WBC which I believe is secondary to stress response/demargination secondary to pain.  Pain was controlled with IV Dilaudid and Toradol.  Spine surgeon on-call for [facility] was consulted and concurs with the evaluation and  disposition plan supportive care, pain management with opiate analgesic as needed, and addition of muscle relaxant.  He and his wife were provided instructions for supportive care and will return as needed for worsened condition or worsening symptoms, or new problems or concerns.    I have reviewed the nursing notes.    I have reviewed the findings, diagnosis, plan and need for follow up with the patient.    New Prescriptions    CYCLOBENZAPRINE (FLEXERIL) 10 MG TABLET    Take 1 tablet (10 mg) by mouth 3 times daily as needed for muscle spasms    OXYCODONE-ACETAMINOPHEN (PERCOCET) 5-325 MG TABLET    Take 1-2 tablets by mouth every 6 hours as needed for severe pain    SENNA-DOCUSATE (SENOKOT-S/PERICOLACE) 8.6-50 MG TABLET    Take 1 tablet by mouth 2 times daily as needed for constipation (To prevent constipation from opiate analgesic use)       Final diagnoses:   Closed fracture of transverse process of lumbar vertebra, initial encounter (H) - L1, L2 and L3       11/14/2022   [facility] EMERGENCY DEPT     Sebastian Zuleta MD  11/14/22 3719

## 2022-11-14 NOTE — ED NOTES
Pt slipped on the ice.  Landed on hip/back.  Pt is having a lot of pain with pressure on leg.  Hard to stand straight up.

## 2022-11-14 NOTE — DISCHARGE INSTRUCTIONS
Ibuprofen 400 mg to 600 mg every 6 hours as needed for pain.    Percocet if needed for pain (oxycodone and acetaminophen/Tylenol).    Flexeril/cyclobenzaprine if needed for back muscle spasms.    Follow-up in orthopedic/sports medicine clinic.    Use one or more over the counter meds as needed to prevent constipation from opiate analgesic (Percocet/Oxycodone):     Fiber supplement  Milk of Magnesia  Miralax powder daily (or generic brand)  Dulcolax or Senokot (laxative)    These are available over-the-counter and can be used together or in combination, as needed to prevent constipation.

## 2022-11-22 ENCOUNTER — OFFICE VISIT (OUTPATIENT)
Dept: PHYSICAL MEDICINE AND REHAB | Facility: CLINIC | Age: 32
End: 2022-11-22
Attending: EMERGENCY MEDICINE
Payer: OTHER MISCELLANEOUS

## 2022-11-22 VITALS
DIASTOLIC BLOOD PRESSURE: 64 MMHG | BODY MASS INDEX: 32.1 KG/M2 | WEIGHT: 224.2 LBS | SYSTOLIC BLOOD PRESSURE: 141 MMHG | HEIGHT: 70 IN | HEART RATE: 48 BPM

## 2022-11-22 DIAGNOSIS — M54.50 ACUTE LEFT-SIDED LOW BACK PAIN WITHOUT SCIATICA: Primary | ICD-10-CM

## 2022-11-22 DIAGNOSIS — S32.009A CLOSED FRACTURE OF TRANSVERSE PROCESS OF LUMBAR VERTEBRA, INITIAL ENCOUNTER (H): ICD-10-CM

## 2022-11-22 PROCEDURE — 99204 OFFICE O/P NEW MOD 45 MIN: CPT | Performed by: NURSE PRACTITIONER

## 2022-11-22 ASSESSMENT — PAIN SCALES - GENERAL: PAINLEVEL: MILD PAIN (3)

## 2022-11-22 NOTE — PROGRESS NOTES
ASSESSMENT: Mario Lay is a 32 year old male who presents for consultation at the request of PCP No Ref-Primary, Physician, with a past medical history significant for acid reflux, epilepsy, knee surgery who presents today for new patient evaluation of:    -Acute left low back pain post fall 2011 14 2022 where patient sustained L2 and L3 displaced transverse process fractures and L1 nondisplaced transverse process fracture.    Patient is neurologically intact on exam. No myelopathic or red flag symptoms.     OSWESTRY DISABILITY INDEX 11/22/2022   Count 10   Sum 15   Oswestry Score (%) 30   Some recent data might be hidden            Diagnoses and all orders for this visit:  Acute left-sided low back pain without sciatica  Closed fracture of transverse process of lumbar vertebra, initial encounter (H)  Comments:  L1, L2 and L3  Orders:  -     Orthopedic  Referral    PLAN:  Reviewed spine anatomy and disease process. Discussed diagnosis and treatment options with the patient today. A shared decision making model was used.  The patient's values and choices were respected. The following represents what was discussed and decided upon by the provider and the patient.      -DIAGNOSTIC TESTS:  Images were personally reviewed and interpreted and explained to patient today using spine model.   -- Lumbar CT scan 11/14/2022 with acute displaced fracture left L2 and L3 transverse process, also a potential nondisplaced fracture left L1 transverse process.  Otherwise normal alignment and normal disc height noted to the lumbar spine and lower thoracic spine.  -- Pelvic CT scan 11/14/2022 with no fracture noted.    -PHYSICAL THERAPY: Did discuss the potential for physical therapy in the next couple of weeks if symptoms are not improving, currently he defers which is reasonable as typically this fracture will heal without the need of  physical therapy.  Did give patient a couple gentle stretches to trial in the  meantime, advised to limit excessive bending or twisting.  Discussed the importance of core strengthening, ROM, stretching exercises with the patient and how each of these entities is important in decreasing pain.  Explained to the patient that the purpose of physical therapy is to teach the patient a home exercise program.  These exercises need to be performed every day in order to decrease pain and prevent future occurrences of pain.        -MEDICATIONS: Patient does not feel he needs any further medications.  Did advise patient to take over-the-counter acetaminophen alternating with ibuprofen if needed for pain.  Patient defers prescription NSAID today as well.  Discussed multiple medication options today with patient. Discussed risks, side effects, and proper use of medications. Patient verbalized understanding.    -INTERVENTIONS: No recommendations for spine injections at this time.    -Workability: Work restrictions given for him to be back to work light duty with restrictions including 15 pound lifting limit limiting bending and twisting and push pull 15 pound restriction as well for 3 weeks.    -PATIENT EDUCATION:  Total time of 46 minutes, on the day of service, spent with the patient, reviewing the chart, placing orders, and documenting.   -Today we also discussed the issues related to the current COVID-19 pandemic, the pros and cons of the current treatment plan, the CDC guidelines such as social distancing, washing hands, masking, and covering the cough.    -FOLLOW-UP:   Follow-up 3 weeks for nurse navigator phone call.  If patient is doing well at that time we can lift restrictions.  Did discuss that if he is still having symptoms and wants to extend his restrictions we would want to see him then in clinic at some point.    Advised patient to call the [facility] if symptoms worsen or you have problems controlling bladder and bowel function.    ______________________________________________________________________    SUBJECTIVE:  HPI:  Mario Lay  Is a 32 year old male who presents today for new patient evaluation of low back pain left upper to mid lumbar spine ongoing since a fall 2011 14 2022 where he landed on his back, did go to the ER immediately and was diagnosed with a transverse process fracture.  Since then his pain is slightly improved at a 3/10, and 9 at its worse with bending and twisting and more so initially.  Pain is a 2 at its best.  Patient denies RIGHT low back pain, denies any lower extremity radiating pain.  Denies numbness or tingling sensations, denies lower extremity weakness.  Denies any prior history of back pain issues.    Patient's wife was present today during entire visit and added to past medical/surgical/family/social history and history of presenting illness.     *Work comp injury 11/14/2022, slipped on ice while working as a , slipped on ice falling straight on his back.  Did go to the emergency department immediately after.    -Treatment to Date: No prior spinal surgery or spinal injections  No prior physical therapy is no history of back pain.    -Medications:  Currently not taking any medications for pain    Did have side effects with hiccups from cyclobenzaprine and upset stomach from oxycodone and Dilaudid.    Current Outpatient Medications   Medication     cyclobenzaprine (FLEXERIL) 10 MG tablet     oxyCODONE-acetaminophen (PERCOCET) 5-325 MG tablet     No current facility-administered medications for this visit.       No Known Allergies    History reviewed. No pertinent past medical history.     There is no problem list on file for this patient.      History reviewed. No pertinent surgical history.    History reviewed. No pertinent family history.    Reviewed past medical, surgical, and family history with patient found on new patient intake packet located in EMR Media tab.     SOCIAL HX:  "Patient is .  Patient does smoke cigarettes currently, reports occasional social alcohol use, denies history being a heavy drinker.  Denies recreational drug use.    ROS: Positive for muscle pain, muscle fatigue, constipation. Specifically negative for bowel/bladder dysfunction, balance changes, headache, dizziness, foot drop, fevers, chills, appetite changes, nausea/vomiting, unexplained weight loss. Otherwise 13 systems reviewed are negative. Please see the patient's intake questionnaire from today for details.    OBJECTIVE:  BP (!) 141/64 (BP Location: Left arm, Patient Position: Sitting, Cuff Size: Adult Regular)   Pulse (!) 48   Ht 5' 10\" (1.778 m)   Wt 224 lb 3.2 oz (101.7 kg)   BMI 32.17 kg/m      PHYSICAL EXAMINATION:    --CONSTITUTIONAL:  Vital signs as above.  No acute distress.  The patient is well nourished and well groomed.  --PSYCHIATRIC:  Appropriate mood and affect. The patient is awake, alert, oriented to person, place, time and answering questions appropriately with clear speech.    --SKIN:  Skin over the face, bilateral lower extremities, and posterior torso is clean, dry, intact without rashes.    --RESPIRATORY: Normal rhythm and effort. No abnormal accessory muscle breathing patterns noted.   --ABDOMINAL:  Non-distended.  --STANDING EXAMINATION:  Normal lumbar lordosis noted, no lateral shift.  --MUSCULOSKELETAL: Lumbar spine inspection reveals no evidence of deformity. Range of motion is not limited in lumbar flexion, extension, lateral rotation. No tenderness to palpation lumbar spine. Straight leg raising in the supine position is negative to radicular pain. Sciatic notch non-tender.  --GROSS MOTOR: Gait is non-antalgic. Easily arises from a seated position.   --LOWER EXTREMITY MOTOR TESTING:  Plantar flexion left 5/5, right 5/5   Dorsiflexion left 5/5, right 5/5   Great toe MTP extension left 5/5, right 5/5  Knee flexion left 5/5, right 5/5  Knee extension left 5/5, right 5/5 "   Hip flexion left 5/5, right 5/5  Hip abduction left 5/5, right 5/5  Hip adduction left 5/5, right 5/5   --HIPS: Full range of motion bilaterally.   --NEUROLOGICAL:  2/4 patellar, medial hamstring, and achilles reflexes bilaterally.  Sensation to light touch is intact in the bilateral L4, L5, and S1 dermatomes. Babinski is negative. No clonus.  Negative Chris reflex bilaterally.  --VASCULAR:  2/4 dorsalis pedis and posterior tibialsi pulses bilaterally.  Bilateral lower extremities are warm.  There is no pitting edema of the bilateral lower extremities.    RESULTS: Prior medical records from [facility] and [facility] were reviewed today.    Imaging: Spine imaging was personally reviewed and interpreted today. The images were shown to the patient and the findings were explained using a spine model.      CT Abdomen Pelvis w Contrast    Result Date: 11/14/2022  CT ABDOMEN PELVIS WITH CONTRAST 11/14/2022 2:32 PM CLINICAL HISTORY: Blunt trauma left flank, evaluate for traumatic abnormality TECHNIQUE: CT scan of the abdomen and pelvis was performed following injection of IV contrast. Multiplanar reformats were obtained. Dose reduction techniques were used. CONTRAST: 90 mL Isovue 370 COMPARISON: None. FINDINGS: LOWER CHEST: Mild atelectasis in both lung bases. HEPATOBILIARY: Diffuse hepatic steatosis. No focal lesions. Portal vein is patent. PANCREAS: Normal. SPLEEN: Splenomegaly is noted with spleen measuring 16.1 cm in length. No focal lesions. No evidence for trauma. ADRENAL GLANDS: Normal. KIDNEYS/BLADDER: Normal. BOWEL: Normal. PELVIC ORGANS: The urinary bladder is distended. No evidence of wall thickening or mass. ADDITIONAL FINDINGS: None. MUSCULOSKELETAL: Minimally displaced fractures are seen of the left lateral L2 and L3 transverse processes. No evidence for hematoma or extravasation of contrast.     IMPRESSION: 1.  Minimally displaced fractures of the left lateral L2 and L3 transverse processes.  2.  Diffuse hepatic steatosis. 3.  Splenomegaly. 4.  No evidence for solid organ injury. ROYAL POSADA MD   SYSTEM ID:  [id number]    CT Lumbar Spine w/o Contrast    Result Date: 11/14/2022  CT LUMBAR SPINE WITHOUT CONTRAST  11/14/2022 2:30 PM HISTORY: Fall, blunt trauma, pain radiating into the left left leg to just past left knee  TECHNIQUE: Axial images of the lumbar spine were obtained without intravenous contrast. Multiplanar reformations were performed. Radiation dose for this scan was reduced using automated exposure control, adjustment of the mA and/or kV according to patient size, or iterative reconstruction technique. COMPARISON: None. FINDINGS:  There are 5 lumbar-type vertebral bodies assumed for the purposes of this dictation. Lumbar spine alignment is within normal limits. No loss of vertebral body height. Acute appearing displaced fractures through the left L2 and L3 transverse processes. Subtle cortical irregularity of the left L1 transverse process which could represent a fracture. No other fractures are appreciated. No significant degenerative disc changes or loss of disc height. No disc herniation. Normal facets. No spinal canal or neural foraminal narrowing. Visualized paraspinous soft tissues are unremarkable.     IMPRESSION:  1. Acute appearing displaced fractures of the left L2 and L3 transverse processes. 2. Subtle cortical step-off of the left L1 transverse process which could also represent a fracture. 3. No other fractures appreciated. No loss of lumbar vertebral body height. Lumbar spine alignment is within normal limits. ANALI PEREZ MD   SYSTEM ID:  [id number]    CT Pelvis Bone wo Contrast    Result Date: 11/14/2022  CT PELVIS BONE WITHOUT CONTRAST 11/14/2022 2:30 PM INDICATION: Low back and sacral pain after a fall. COMPARISON: None. TECHNIQUE: Noncontrast. Axial, sagittal and coronal thin-section reconstruction. Dose reduction techniques were used. CONTRAST: None. FINDINGS: BONES  AND JOINTS: -Normal joint spacing and alignment. -No fracture. -Small benign bone island within the right iliac wing. MUSCULATURE AND SOFT TISSUES: -No muscle atrophy. -No soft tissue fluid collection or contusion.     IMPRESSION: 1.  Normal noncontrast CT of the pelvis. 2.  No fracture or joint malalignment. NIDIA MANE MD   SYSTEM ID:  [id number]

## 2022-11-22 NOTE — LETTER
November 22, 2022      Mario Lay  [address] [place] [id number]        To Whom It May Concern:    Mario Lay was seen in our clinic. He may return to work with the following: 15 pound lifting limit, limit bending and twisting as tolerated, limit push/pull over 15 pounds as well. Restrictions for 3 weeks through 12/13/2022. Will revisit further workability needs at that time.       Sincerely,        Kat English, CNP

## 2022-11-22 NOTE — PATIENT INSTRUCTIONS
~Please call our [facility] Nurse Navigation line [phone] with any questions or concerns about your treatment plan, if symptoms worsen and you would like to be seen urgently, or if you have problems controlling bladder and bowel function.

## 2022-12-13 NOTE — PROGRESS NOTES
Assessment:   Mario Lay is a 32 year old y.o. male with past medical history significant for acid reflux, epilepsy who presents today for follow-up regarding acute left low back pain.  Patient had a fall November 14, 2022 while at work November 14, 2022.  CT lumbar spine showed acute fractures of the left L2 and L3 transverse processes and  subtle cortical step-off of the left L1 transverse process which could also represent a fracture.  Patient reports 70 to 90% improvement in his pain.  - Patient also complains of a 3 inch x 3 inch area of numbness on the right lateral calf which is new since his fall.  Suspect this is related to the trauma.  He is not sure if he hit his right leg on anything.  I do not see any right L5 nerve root compromise which would cause paresthesias in the right lateral calf.  Strength and reflexes remain intact.    PSP: Kat English CNP  Plan:     A shared decision making plan was used.  The patient's values and choices were respected.  The following represents what was discussed and decided upon by the physician assistant and the patient.      1.  DIAGNOSTIC TESTS: I reviewed the CT lumbar spine.  - I offered to get an EMG right lower extremity for further evaluation of his right lateral calf numbness.  He declined for now.  He states that it is getting better as his back pain improves.  I told the patient if it fails to improve further or gets worse, he should send us a [employer] message and we will order a EMG right lower extremity.    2.  PHYSICAL THERAPY: No physical therapy was ordered.    3.  MEDICATIONS: No changes are made to the patient's medications.  He is not taking any pain relief medications.  The pain medication upset his stomach and the muscle relaxer gave him hiccups.    4.  INTERVENTIONS: No interventions were ordered.    5.  WORK: Patient works as a .  He has been on light duty working in the office.  He feels ready to return to full duty  work.  I provided a work note releasing him to full duty work immediately.    6.  FOLLOW-UP: Patient will follow up as needed.  If he has any questions or concerns, he should not hesitate to contact our clinic.    Subjective:     Mario Lay is a 32 year old male who presents today for follow-up regarding acute low back pain.  Patient slipped and fell on ice while working as a  in November 14, 2022.  CT showed fractures of the left L2 and L3 transverse processes and subtle cortical step-off of the left L1 transverse process which could also represent a fracture.  Patient reports 70 to 90% improvement in his pain over the past 1 month.    Patient complains of minimal residual left-sided low back pain.  He still feels that the muscles on the left side of the lower back are somewhat tight.  He denies any pain down the legs.  He states that he has noticed since his injury a small area of numbness on the right lateral calf.  When he dries off his leg after using the shower that area feels hypersensitive and sometimes it feels tight.  He states this is getting better as his back pain improves.  He denies any weakness.  Denies loss of bowel or bladder control.  Denies recent fevers.  He rates his pain today as a 1 out of 10.  At its worst it is a 3 out of 10.  At its best it is a 1 out of 10.  Pain is aggravated with twisting and alleviated with walking.    Patient has not had any physical therapy.  He is not taking any pain relieving medications.    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  He feels ready to return to full duty work.    Review of Systems:  Positive for numbness/tingling.  Negative for weakness, loss of bowel/bladder control, inability to urinate, headache, pain much worse at night, trip/stumble/falls, difficulty swallowing, difficulty with hand skills, fevers, unintentional weight loss.     Objective:   CONSTITUTIONAL:  Vital signs as above.   No acute distress.  The patient is well nourished and well groomed.    PSYCHIATRIC:  The patient is awake, alert, oriented to person, place and time.  The patient is answering questions appropriately with clear speech.  Normal affect.  HEENT: Normocephalic, atraumatic.  Sclera clear.    SKIN:  Skin over the face, posterior torso, bilateral upper and lower extremities is clean, dry, intact without rashes.  VASCULAR: No significant lower extremity edema.  MUSCULOSKELETAL:  Gait is non-antalgic.  The patient is able to heel and toe walk without any difficulty.  Lumbar range of motion is full.  No tenderness over the bilateral lower lumbar paraspinal muscles.  Mild hypertonicity right lumbar paraspinous muscles.  The patient has 5/5 strength for the bilateral hip flexors, knee flexors/extensors, ankle dorsiflexors/plantar flexors, ankle evertors/invertors.    NEUROLOGICAL: 2+ patellar, achilles reflexes which are symmetric bilaterally.  No ankle clonus bilaterally.  Diminished sensation 3 inch x 3 inch circular area on the right lateral calf.     RESULTS:    I reviewed the CT lumbar spine from [facility] dated November 14, 2022.  This shows acute appearing displaced fractures of the left L2 and L3 transverse processes.  There is also subtle cortical step-off of the left L1 transverse process which could also represent a fracture.  No spinal canal or neuroforaminal stenosis.    I reviewed the CT bony pelvis from [place] dated November 14, 2022.  This is normal.

## 2022-12-14 ENCOUNTER — OFFICE VISIT (OUTPATIENT)
Dept: PHYSICAL MEDICINE AND REHAB | Facility: CLINIC | Age: 32
End: 2022-12-14
Payer: OTHER MISCELLANEOUS

## 2022-12-14 VITALS
BODY MASS INDEX: 32.07 KG/M2 | HEART RATE: 80 BPM | HEIGHT: 70 IN | DIASTOLIC BLOOD PRESSURE: 81 MMHG | WEIGHT: 224 LBS | SYSTOLIC BLOOD PRESSURE: 141 MMHG

## 2022-12-14 DIAGNOSIS — S32.009D CLOSED FRACTURE OF TRANSVERSE PROCESS OF LUMBAR VERTEBRA WITH ROUTINE HEALING, SUBSEQUENT ENCOUNTER: Primary | ICD-10-CM

## 2022-12-14 PROCEDURE — 99203 OFFICE O/P NEW LOW 30 MIN: CPT | Performed by: PHYSICIAN ASSISTANT

## 2022-12-14 ASSESSMENT — PAIN SCALES - GENERAL: PAINLEVEL: NO PAIN (1)

## 2022-12-14 NOTE — PATIENT INSTRUCTIONS
If the leg numbness doesn't get better, please send us a [employer] message and we can order an EMG (test of nerve function).

## 2022-12-14 NOTE — LETTER
December 14, 2022      Mario Lay  [address] [place] [id number]        To Whom It May Concern:    Mario Lay was seen in our clinic. He may return to work without restrictions immediately.      Sincerely,        Kindra Rashid PA-C

## 2022-12-14 NOTE — LETTER
12/14/2022         RE: Mario Lay  [address] [facility] [address]        Dear Colleague,    Thank you for referring your patient, Mario Lay, to the [facility]. Please see a copy of my visit note below.    Assessment:   Mario Lay is a 32 year old y.o. male with past medical history significant for acid reflux, epilepsy who presents today for follow-up regarding acute left low back pain.  Patient had a fall November 14, 2022 while at work November 14, 2022.  CT lumbar spine showed acute fractures of the left L2 and L3 transverse processes and  subtle cortical step-off of the left L1 transverse process which could also represent a fracture.  Patient reports 70 to 90% improvement in his pain.  - Patient also complains of a 3 inch x 3 inch area of numbness on the right lateral calf which is new since his fall.  Suspect this is related to the trauma.  He is not sure if he hit his right leg on anything.  I do not see any right L5 nerve root compromise which would cause paresthesias in the right lateral calf.  Strength and reflexes remain intact.    PSP: Kat English CNP  Plan:     A shared decision making plan was used.  The patient's values and choices were respected.  The following represents what was discussed and decided upon by the physician assistant and the patient.      1.  DIAGNOSTIC TESTS: I reviewed the CT lumbar spine.  - I offered to get an EMG right lower extremity for further evaluation of his right lateral calf numbness.  He declined for now.  He states that it is getting better as his back pain improves.  I told the patient if it fails to improve further or gets worse, he should send us a [employer] message and we will order a EMG right lower extremity.    2.  PHYSICAL THERAPY: No physical therapy was ordered.    3.  MEDICATIONS: No changes are made to the patient's medications.  He is not taking any pain relief medications.  The  pain medication upset his stomach and the muscle relaxer gave him hiccups.    4.  INTERVENTIONS: No interventions were ordered.    5.  WORK: Patient works as a .  He has been on light duty working in the office.  He feels ready to return to full duty work.  I provided a work note releasing him to full duty work immediately.    6.  FOLLOW-UP: Patient will follow up as needed.  If he has any questions or concerns, he should not hesitate to contact our clinic.    Subjective:     Mario Lay is a 32 year old male who presents today for follow-up regarding acute low back pain.  Patient slipped and fell on ice while working as a  in November 14, 2022.  CT showed fractures of the left L2 and L3 transverse processes and subtle cortical step-off of the left L1 transverse process which could also represent a fracture.  Patient reports 70 to 90% improvement in his pain over the past 1 month.    Patient complains of minimal residual left-sided low back pain.  He still feels that the muscles on the left side of the lower back are somewhat tight.  He denies any pain down the legs.  He states that he has noticed since his injury a small area of numbness on the right lateral calf.  When he dries off his leg after using the shower that area feels hypersensitive and sometimes it feels tight.  He states this is getting better as his back pain improves.  He denies any weakness.  Denies loss of bowel or bladder control.  Denies recent fevers.  He rates his pain today as a 1 out of 10.  At its worst it is a 3 out of 10.  At its best it is a 1 out of 10.  Pain is aggravated with twisting and alleviated with walking.    Patient has not had any physical therapy.  He is not taking any pain relieving medications.    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  He feels ready to return to full duty work.    Review of Systems:  Positive for numbness/tingling.  Negative  for weakness, loss of bowel/bladder control, inability to urinate, headache, pain much worse at night, trip/stumble/falls, difficulty swallowing, difficulty with hand skills, fevers, unintentional weight loss.     Objective:   CONSTITUTIONAL:  Vital signs as above.  No acute distress.  The patient is well nourished and well groomed.    PSYCHIATRIC:  The patient is awake, alert, oriented to person, place and time.  The patient is answering questions appropriately with clear speech.  Normal affect.  HEENT: Normocephalic, atraumatic.  Sclera clear.    SKIN:  Skin over the face, posterior torso, bilateral upper and lower extremities is clean, dry, intact without rashes.  VASCULAR: No significant lower extremity edema.  MUSCULOSKELETAL:  Gait is non-antalgic.  The patient is able to heel and toe walk without any difficulty.  Lumbar range of motion is full.  No tenderness over the bilateral lower lumbar paraspinal muscles.  Mild hypertonicity right lumbar paraspinous muscles.  The patient has 5/5 strength for the bilateral hip flexors, knee flexors/extensors, ankle dorsiflexors/plantar flexors, ankle evertors/invertors.    NEUROLOGICAL: 2+ patellar, achilles reflexes which are symmetric bilaterally.  No ankle clonus bilaterally.  Diminished sensation 3 inch x 3 inch circular area on the right lateral calf.     RESULTS:    I reviewed the CT lumbar spine from [facility] dated November 14, 2022.  This shows acute appearing displaced fractures of the left L2 and L3 transverse processes.  There is also subtle cortical step-off of the left L1 transverse process which could also represent a fracture.  No spinal canal or neuroforaminal stenosis.    I reviewed the CT bony pelvis from [place] dated November 14, 2022.  This is normal.          Again, thank you for allowing me to participate in the care of your patient.        Sincerely,        Kindra Rashid PA-C

## 2023-04-23 ENCOUNTER — HEALTH MAINTENANCE LETTER (OUTPATIENT)
Age: 33
End: 2023-04-23

## 2023-12-03 ENCOUNTER — HEALTH MAINTENANCE LETTER (OUTPATIENT)
Age: 33
End: 2023-12-03

## 2025-01-05 ENCOUNTER — HEALTH MAINTENANCE LETTER (OUTPATIENT)
Age: 35
End: 2025-01-05

## 2025-07-24 ENCOUNTER — OFFICE VISIT (OUTPATIENT)
Dept: FAMILY MEDICINE | Facility: CLINIC | Age: 35
End: 2025-07-24
Payer: COMMERCIAL

## 2025-07-24 VITALS
RESPIRATION RATE: 20 BRPM | SYSTOLIC BLOOD PRESSURE: 139 MMHG | HEART RATE: 106 BPM | TEMPERATURE: 98.2 F | DIASTOLIC BLOOD PRESSURE: 84 MMHG | OXYGEN SATURATION: 98 % | HEIGHT: 69 IN | WEIGHT: 237 LBS | BODY MASS INDEX: 35.1 KG/M2

## 2025-07-24 DIAGNOSIS — Z00.00 ROUTINE GENERAL MEDICAL EXAMINATION AT A HEALTH CARE FACILITY: Primary | ICD-10-CM

## 2025-07-24 DIAGNOSIS — R53.83 OTHER FATIGUE: ICD-10-CM

## 2025-07-24 LAB
ALBUMIN SERPL BCG-MCNC: 5.1 G/DL (ref 3.5–5.2)
ALP SERPL-CCNC: 82 U/L (ref 40–150)
ALT SERPL W P-5'-P-CCNC: 81 U/L (ref 0–70)
ANION GAP SERPL CALCULATED.3IONS-SCNC: 13 MMOL/L (ref 7–15)
AST SERPL W P-5'-P-CCNC: 37 U/L (ref 0–45)
BILIRUB SERPL-MCNC: 0.3 MG/DL
BUN SERPL-MCNC: 14.7 MG/DL (ref 6–20)
CALCIUM SERPL-MCNC: 10.1 MG/DL (ref 8.8–10.4)
CHLORIDE SERPL-SCNC: 99 MMOL/L (ref 98–107)
CHOLEST SERPL-MCNC: 195 MG/DL
CREAT SERPL-MCNC: 1.02 MG/DL (ref 0.67–1.17)
EGFRCR SERPLBLD CKD-EPI 2021: >90 ML/MIN/1.73M2
ERYTHROCYTE [DISTWIDTH] IN BLOOD BY AUTOMATED COUNT: 12.3 % (ref 10–15)
EST. AVERAGE GLUCOSE BLD GHB EST-MCNC: 108 MG/DL
FASTING STATUS PATIENT QL REPORTED: NO
FASTING STATUS PATIENT QL REPORTED: NO
GLUCOSE SERPL-MCNC: 83 MG/DL (ref 70–99)
HBA1C MFR BLD: 5.4 % (ref 0–5.6)
HCO3 SERPL-SCNC: 27 MMOL/L (ref 22–29)
HCT VFR BLD AUTO: 43.9 % (ref 40–53)
HDLC SERPL-MCNC: 30 MG/DL
HGB BLD-MCNC: 15.3 G/DL (ref 13.3–17.7)
LDLC SERPL CALC-MCNC: ABNORMAL MG/DL
MCH RBC QN AUTO: 31.1 PG (ref 26.5–33)
MCHC RBC AUTO-ENTMCNC: 34.9 G/DL (ref 31.5–36.5)
MCV RBC AUTO: 89 FL (ref 78–100)
NONHDLC SERPL-MCNC: 165 MG/DL
PLATELET # BLD AUTO: 195 10E3/UL (ref 150–450)
POTASSIUM SERPL-SCNC: 4.1 MMOL/L (ref 3.4–5.3)
PROT SERPL-MCNC: 7.8 G/DL (ref 6.4–8.3)
RBC # BLD AUTO: 4.92 10E6/UL (ref 4.4–5.9)
SODIUM SERPL-SCNC: 139 MMOL/L (ref 135–145)
TRIGL SERPL-MCNC: 603 MG/DL
TSH SERPL DL<=0.005 MIU/L-ACNC: 1.23 UIU/ML (ref 0.3–4.2)
WBC # BLD AUTO: 11 10E3/UL (ref 4–11)

## 2025-07-24 PROCEDURE — 1126F AMNT PAIN NOTED NONE PRSNT: CPT | Performed by: NURSE PRACTITIONER

## 2025-07-24 PROCEDURE — 3049F LDL-C 100-129 MG/DL: CPT | Performed by: NURSE PRACTITIONER

## 2025-07-24 PROCEDURE — 99395 PREV VISIT EST AGE 18-39: CPT | Performed by: NURSE PRACTITIONER

## 2025-07-24 PROCEDURE — 85027 COMPLETE CBC AUTOMATED: CPT | Performed by: NURSE PRACTITIONER

## 2025-07-24 PROCEDURE — 3079F DIAST BP 80-89 MM HG: CPT | Performed by: NURSE PRACTITIONER

## 2025-07-24 PROCEDURE — 83721 ASSAY OF BLOOD LIPOPROTEIN: CPT | Mod: 59 | Performed by: NURSE PRACTITIONER

## 2025-07-24 PROCEDURE — 80061 LIPID PANEL: CPT | Performed by: NURSE PRACTITIONER

## 2025-07-24 PROCEDURE — 99213 OFFICE O/P EST LOW 20 MIN: CPT | Mod: 25 | Performed by: NURSE PRACTITIONER

## 2025-07-24 PROCEDURE — 80053 COMPREHEN METABOLIC PANEL: CPT | Performed by: NURSE PRACTITIONER

## 2025-07-24 PROCEDURE — 36415 COLL VENOUS BLD VENIPUNCTURE: CPT | Performed by: NURSE PRACTITIONER

## 2025-07-24 PROCEDURE — 82306 VITAMIN D 25 HYDROXY: CPT | Performed by: NURSE PRACTITIONER

## 2025-07-24 PROCEDURE — 3044F HG A1C LEVEL LT 7.0%: CPT | Performed by: NURSE PRACTITIONER

## 2025-07-24 PROCEDURE — 82607 VITAMIN B-12: CPT | Performed by: NURSE PRACTITIONER

## 2025-07-24 PROCEDURE — 83036 HEMOGLOBIN GLYCOSYLATED A1C: CPT | Performed by: NURSE PRACTITIONER

## 2025-07-24 PROCEDURE — 84443 ASSAY THYROID STIM HORMONE: CPT | Performed by: NURSE PRACTITIONER

## 2025-07-24 PROCEDURE — 3075F SYST BP GE 130 - 139MM HG: CPT | Performed by: NURSE PRACTITIONER

## 2025-07-24 SDOH — HEALTH STABILITY: PHYSICAL HEALTH
ON AVERAGE, HOW MANY DAYS PER WEEK DO YOU ENGAGE IN MODERATE TO STRENUOUS EXERCISE (LIKE A BRISK WALK)?: PATIENT DECLINED

## 2025-07-24 ASSESSMENT — PAIN SCALES - GENERAL: PAINLEVEL_OUTOF10: NO PAIN (0)

## 2025-07-24 ASSESSMENT — SOCIAL DETERMINANTS OF HEALTH (SDOH): HOW OFTEN DO YOU GET TOGETHER WITH FRIENDS OR RELATIVES?: ONCE A WEEK

## 2025-07-24 NOTE — PROGRESS NOTES
"Preventive Care Visit  [facility]  PAPA Flower CNP, Family Medicine  Jul 24, 2025      Assessment & Plan   Problem List Items Addressed This Visit    None  Visit Diagnoses         Routine general medical examination at a health care facility    -  Primary    Relevant Orders    REVIEW OF HEALTH MAINTENANCE PROTOCOL ORDERS (Completed)    Comprehensive metabolic panel (BMP + Alb, Alk Phos, ALT, AST, Total. Bili, TP) (Completed)    CBC with platelets (Completed)    Lipid panel reflex to direct LDL Fasting (Completed)    Hemoglobin A1c (Completed)    LDL cholesterol direct (Completed)      Other fatigue        Relevant Orders    TSH with free T4 reflex (Completed)    Vitamin D Deficiency (Completed)    Vitamin B12 (Completed)    OFFICE/OUTPT VISIT,EST,LEVL III (Completed)        Patient concerns of fatigue did review anxiety pressure patient does not feel that this is a factor.  Declined any medication interventions.  We will obtain labs and further evaluate.    Patient has been advised of split billing requirements and indicates understanding: Yes    Nicotine/Tobacco Cessation  He reports that he has been smoking cigarettes. He has never used smokeless tobacco.  Nicotine/Tobacco Cessation Plan  Information offered: Patient not interested at this time      BMI  Estimated body mass index is 35 kg/m  as calculated from the following:    Height as of this encounter: 1.753 m (5' 9\").    Weight as of this encounter: 107.5 kg (237 lb).   Weight management plan: Discussed healthy diet and exercise guidelines    Counseling  Appropriate preventive services were addressed with this patient via screening, questionnaire, or discussion as appropriate for fall prevention, nutrition, physical activity, Tobacco-use cessation, social engagement, weight loss and cognition.  Checklist reviewing preventive services available has been given to the patient.  Reviewed patient's diet, addressing concerns " and/or questions.   Reviewed preventive health counseling, as reflected in patient instructions       Regular exercise       Healthy diet/nutrition       Vision screening       Hearing screening       Alcohol Use     Follow-up    Follow-up Visit   Expected date:  Jul 24, 2026 (Approximate)      Follow Up Appointment Details:     Follow-up with whom?: PCP    Follow-Up for what?: Adult Preventive    How?: In Person                 Elgin Martin is a 35 year old, presenting for the following:  Physical        7/24/2025     2:37 PM   Additional Questions   Roomed by Tamar HINTON       Advance Care Planning    Discussed advance care planning with patient; however, patient declined at this time.        7/24/2025   General Health   How would you rate your overall physical health? (!) FAIR   Feel stress (tense, anxious, or unable to sleep) Only a little   (!) STRESS CONCERN      7/24/2025   Nutrition   Three or more servings of calcium each day? Yes   Diet: Regular (no restrictions)   How many servings of fruit and vegetables per day? (!) 0-1   How many sweetened beverages each day? (!) 3         7/24/2025   Exercise   Days per week of moderate/strenous exercise Patient declined         7/24/2025   Social Factors   Frequency of gathering with friends or relatives Once a week   Worry food won't last until get money to buy more No   Food not last or not have enough money for food? No   Do you have housing? (Housing is defined as stable permanent housing and does not include staying outside in a car, in a tent, in an abandoned building, in an overnight shelter, or couch-surfing.) Yes   Are you worried about losing your housing? No   Lack of transportation? No   Unable to get utilities (heat,electricity)? No         7/24/2025   Dental   Dentist two times every year? Yes         Today's PHQ-2 Score:       7/24/2025     2:35 PM   PHQ-2 ( 1999 [employer])   Q1: Little interest or pleasure in doing things 1   Q2: Feeling  "down, depressed or hopeless 0   PHQ-2 Score 1    Q1: Little interest or pleasure in doing things Several days   Q2: Feeling down, depressed or hopeless Not at all   PHQ-2 Score 1       Patient-reported           7/24/2025   Substance Use   Alcohol more than 3/day or more than 7/wk No   Do you use any other substances recreationally? No     Social History     Tobacco Use    Smoking status: Every Day     Types: Cigarettes    Smokeless tobacco: Never   Vaping Use    Vaping status: Never Used   Substance Use Topics    Alcohol use: Not Currently    Drug use: Not Currently           7/24/2025   STI Screening   New sexual partner(s) since last STI/HIV test? No         7/24/2025   Contraception/Family Planning   Questions about contraception or family planning No        Reviewed and updated as needed this visit by Provider                    BP Readings from Last 3 Encounters:   07/24/25 139/84   12/14/22 (!) 141/81   11/22/22 (!) 141/64    Wt Readings from Last 3 Encounters:   07/24/25 107.5 kg (237 lb)   12/14/22 101.6 kg (224 lb)   11/22/22 101.7 kg (224 lb 3.2 oz)                  There is no problem list on file for this patient.    No past surgical history on file.    Social History     Tobacco Use    Smoking status: Every Day     Types: Cigarettes    Smokeless tobacco: Never   Substance Use Topics    Alcohol use: Not Currently     No family history on file.      No current outpatient medications on file.     No Known Allergies  Recent Labs   Lab Test 11/14/22  1339 10/31/22  0724   LDL  --  82   HDL  --  29*   TRIG  --  315*   ALT 64* 56*   CR 0.99 0.94   GFRESTIMATED >90 >90   POTASSIUM 4.3 4.3          Review of Systems  Constitutional, HEENT, cardiovascular, pulmonary, gi and gu systems are negative, except as otherwise noted.     Objective    Exam  /84   Pulse 106   Temp 98.2  F (36.8  C) (Tympanic)   Resp 20   Ht 1.753 m (5' 9\")   Wt 107.5 kg (237 lb)   SpO2 98%   BMI 35.00 kg/m     Estimated body " "mass index is 35 kg/m  as calculated from the following:    Height as of this encounter: 1.753 m (5' 9\").    Weight as of this encounter: 107.5 kg (237 lb).    Physical Exam  GENERAL: alert and no distress  EYES: Eyes grossly normal to inspection, PERRL and conjunctivae and sclerae normal  HENT: ear canals and TM's normal, nose and mouth without ulcers or lesions  NECK: no adenopathy, no asymmetry, masses, or scars  RESP: lungs clear to auscultation - no rales, rhonchi or wheezes  CV: regular rate and rhythm, normal S1 S2, no S3 or S4, no murmur, click or rub, no peripheral edema  ABDOMEN: soft, nontender, no hepatosplenomegaly, no masses and bowel sounds normal  MS: no gross musculoskeletal defects noted, no edema  SKIN: no suspicious lesions or rashes  NEURO: Normal strength and tone, mentation intact and speech normal  PSYCH: mentation appears normal, affect normal/bright        Signed Electronically by: PAPA Flower CNP    "

## 2025-07-24 NOTE — PATIENT INSTRUCTIONS
Patient Education   Preventive Care Advice   This is general advice given by our system to help you stay healthy. However, your care team may have specific advice just for you. Please talk to your care team about your preventive care needs.  Nutrition  Eat 5 or more servings of fruits and vegetables each day.  Try wheat bread, brown rice and whole grain pasta (instead of white bread, rice, and pasta).  Get enough calcium and vitamin D. Check the label on foods and aim for 100% of the RDA (recommended daily allowance).  Lifestyle  Exercise at least 150 minutes each week  (30 minutes a day, 5 days a week).  Do muscle strengthening activities 2 days a week. These help control your weight and prevent disease.  No smoking.  Wear sunscreen to prevent skin cancer.  Have a dental exam and cleaning every 6 months.  Yearly exams  See your health care team every year to talk about:  Any changes in your health.  Any medicines your care team has prescribed.  Preventive care, family planning, and ways to prevent chronic diseases.  Shots (vaccines)   HPV shots (up to age 26), if you've never had them before.  Hepatitis B shots (up to age 59), if you've never had them before.  COVID-19 shot: Get this shot when it's due.  Flu shot: Get a flu shot every year.  Tetanus shot: Get a tetanus shot every 10 years.  Pneumococcal, hepatitis A, and RSV shots: Ask your care team if you need these based on your risk.  Shingles shot (for age 50 and up)  General health tests  Diabetes screening:  Starting at age 35, Get screened for diabetes at least every 3 years.  If you are younger than age 35, ask your care team if you should be screened for diabetes.  Cholesterol test: At age 39, start having a cholesterol test every 5 years, or more often if advised.  Bone density scan (DEXA): At age 50, ask your care team if you should have this scan for osteoporosis (brittle bones).  Hepatitis C: Get tested at least once in your life.  STIs (sexually  transmitted infections)  Before age 24: Ask your care team if you should be screened for STIs.  After age 24: Get screened for STIs if you're at risk. You are at risk for STIs (including HIV) if:  You are sexually active with more than one person.  You don't use condoms every time.  You or a partner was diagnosed with a sexually transmitted infection.  If you are at risk for HIV, ask about PrEP medicine to prevent HIV.  Get tested for HIV at least once in your life, whether you are at risk for HIV or not.  Cancer screening tests  Cervical cancer screening: If you have a cervix, begin getting regular cervical cancer screening tests starting at age 21.  Breast cancer scan (mammogram): If you've ever had breasts, begin having regular mammograms starting at age 40. This is a scan to check for breast cancer.  Colon cancer screening: It is important to start screening for colon cancer at age 45.  Have a colonoscopy test every 10 years (or more often if you're at risk) Or, ask your provider about stool tests like a FIT test every year or Cologuard test every 3 years.  To learn more about your testing options, visit:   .  For help making a decision, visit:   https://bit.ly/lu87285.  Prostate cancer screening test: If you have a prostate, ask your care team if a prostate cancer screening test (PSA) at age 55 is right for you.  Lung cancer screening: If you are a current or former smoker ages 50 to 80, ask your care team if ongoing lung cancer screenings are right for you.  For informational purposes only. Not to replace the advice of your health care provider. Copyright   2023 [facility] [employer]. All rights reserved. Clinically reviewed by the [facility] Transitions Program. [employer] 018589 - REV 01/24.     Patient Education   Preventive Care Advice   This is general advice given by our system to help you stay healthy. However, your care team may have specific advice just for you. Please talk to your care team  about your preventive care needs.  Nutrition  Eat 5 or more servings of fruits and vegetables each day.  Try wheat bread, brown rice and whole grain pasta (instead of white bread, rice, and pasta).  Get enough calcium and vitamin D. Check the label on foods and aim for 100% of the RDA (recommended daily allowance).  Lifestyle  Exercise at least 150 minutes each week  (30 minutes a day, 5 days a week).  Do muscle strengthening activities 2 days a week. These help control your weight and prevent disease.  No smoking.  Wear sunscreen to prevent skin cancer.  Have a dental exam and cleaning every 6 months.  Yearly exams  See your health care team every year to talk about:  Any changes in your health.  Any medicines your care team has prescribed.  Preventive care, family planning, and ways to prevent chronic diseases.  Shots (vaccines)   HPV shots (up to age 26), if you've never had them before.  Hepatitis B shots (up to age 59), if you've never had them before.  COVID-19 shot: Get this shot when it's due.  Flu shot: Get a flu shot every year.  Tetanus shot: Get a tetanus shot every 10 years.  Pneumococcal, hepatitis A, and RSV shots: Ask your care team if you need these based on your risk.  Shingles shot (for age 50 and up)  General health tests  Diabetes screening:  Starting at age 35, Get screened for diabetes at least every 3 years.  If you are younger than age 35, ask your care team if you should be screened for diabetes.  Cholesterol test: At age 39, start having a cholesterol test every 5 years, or more often if advised.  Bone density scan (DEXA): At age 50, ask your care team if you should have this scan for osteoporosis (brittle bones).  Hepatitis C: Get tested at least once in your life.  STIs (sexually transmitted infections)  Before age 24: Ask your care team if you should be screened for STIs.  After age 24: Get screened for STIs if you're at risk. You are at risk for STIs (including HIV) if:  You are  sexually active with more than one person.  You don't use condoms every time.  You or a partner was diagnosed with a sexually transmitted infection.  If you are at risk for HIV, ask about PrEP medicine to prevent HIV.  Get tested for HIV at least once in your life, whether you are at risk for HIV or not.  Cancer screening tests  Cervical cancer screening: If you have a cervix, begin getting regular cervical cancer screening tests starting at age 21.  Breast cancer scan (mammogram): If you've ever had breasts, begin having regular mammograms starting at age 40. This is a scan to check for breast cancer.  Colon cancer screening: It is important to start screening for colon cancer at age 45.  Have a colonoscopy test every 10 years (or more often if you're at risk) Or, ask your provider about stool tests like a FIT test every year or Cologuard test every 3 years.  To learn more about your testing options, visit:   .  For help making a decision, visit:   https://bit.ly/dl81376.  Prostate cancer screening test: If you have a prostate, ask your care team if a prostate cancer screening test (PSA) at age 55 is right for you.  Lung cancer screening: If you are a current or former smoker ages 50 to 80, ask your care team if ongoing lung cancer screenings are right for you.  For informational purposes only. Not to replace the advice of your health care provider. Copyright   2023 [employer]. All rights reserved. Clinically reviewed by the [facility] Transitions Program. [employer] [id number] - REV 01/24.

## 2025-07-25 LAB
LDLC SERPL DIRECT ASSAY-MCNC: 106 MG/DL
VIT B12 SERPL-MCNC: 1291 PG/ML (ref 232–1245)
VIT D+METAB SERPL-MCNC: 23 NG/ML (ref 20–50)